# Patient Record
Sex: FEMALE | Race: WHITE | NOT HISPANIC OR LATINO | URBAN - NONMETROPOLITAN AREA
[De-identification: names, ages, dates, MRNs, and addresses within clinical notes are randomized per-mention and may not be internally consistent; named-entity substitution may affect disease eponyms.]

---

## 2024-02-13 ENCOUNTER — OFFICE VISIT (OUTPATIENT)
Dept: CARDIOLOGY | Facility: CLINIC | Age: 79
End: 2024-02-13
Payer: MEDICARE

## 2024-02-13 VITALS
BODY MASS INDEX: 18.99 KG/M2 | DIASTOLIC BLOOD PRESSURE: 67 MMHG | HEART RATE: 68 BPM | WEIGHT: 121 LBS | SYSTOLIC BLOOD PRESSURE: 131 MMHG | HEIGHT: 67 IN

## 2024-02-13 DIAGNOSIS — E78.2 MIXED HYPERLIPIDEMIA: ICD-10-CM

## 2024-02-13 DIAGNOSIS — R94.31 ABNORMAL ELECTROCARDIOGRAM (ECG) (EKG): ICD-10-CM

## 2024-02-13 DIAGNOSIS — I10 PRIMARY HYPERTENSION: ICD-10-CM

## 2024-02-13 DIAGNOSIS — I48.0 PAROXYSMAL ATRIAL FIBRILLATION: Primary | ICD-10-CM

## 2024-02-13 DIAGNOSIS — R09.89 BILATERAL CAROTID BRUITS: ICD-10-CM

## 2024-02-13 PROCEDURE — 99204 OFFICE O/P NEW MOD 45 MIN: CPT | Performed by: NURSE PRACTITIONER

## 2024-02-13 PROCEDURE — 1159F MED LIST DOCD IN RCRD: CPT | Performed by: NURSE PRACTITIONER

## 2024-02-13 PROCEDURE — 93000 ELECTROCARDIOGRAM COMPLETE: CPT | Performed by: NURSE PRACTITIONER

## 2024-02-13 PROCEDURE — 1160F RVW MEDS BY RX/DR IN RCRD: CPT | Performed by: NURSE PRACTITIONER

## 2024-02-13 RX ORDER — APIXABAN 5 MG/1
5 TABLET, FILM COATED ORAL EVERY 12 HOURS SCHEDULED
COMMUNITY
Start: 2023-12-01

## 2024-02-13 RX ORDER — ROSUVASTATIN CALCIUM 10 MG/1
1 TABLET, COATED ORAL DAILY
COMMUNITY
Start: 2003-05-05 | End: 2024-02-13 | Stop reason: SDUPTHER

## 2024-02-13 RX ORDER — ONDANSETRON 4 MG/1
8 TABLET, ORALLY DISINTEGRATING ORAL EVERY 8 HOURS PRN
COMMUNITY

## 2024-02-13 RX ORDER — TRAMADOL HYDROCHLORIDE 50 MG/1
1 TABLET ORAL 4 TIMES DAILY
COMMUNITY
Start: 2024-02-06

## 2024-02-13 RX ORDER — PNV NO.95/FERROUS FUM/FOLIC AC 28MG-0.8MG
325 TABLET ORAL
COMMUNITY
Start: 2020-07-15

## 2024-02-13 RX ORDER — OMEPRAZOLE 20 MG/1
20 CAPSULE, DELAYED RELEASE ORAL DAILY
COMMUNITY
Start: 2022-10-01

## 2024-02-13 RX ORDER — ROSUVASTATIN CALCIUM 10 MG/1
10 TABLET, COATED ORAL DAILY
Qty: 90 TABLET | Refills: 3 | Status: SHIPPED | OUTPATIENT
Start: 2024-02-13

## 2024-02-13 RX ORDER — LISINOPRIL 10 MG/1
10 TABLET ORAL DAILY
COMMUNITY
End: 2024-02-13 | Stop reason: ALTCHOICE

## 2024-02-16 ENCOUNTER — TELEPHONE (OUTPATIENT)
Dept: CARDIOLOGY | Facility: CLINIC | Age: 79
End: 2024-02-16
Payer: MEDICARE

## 2024-02-19 ENCOUNTER — TELEPHONE (OUTPATIENT)
Dept: CARDIOLOGY | Facility: CLINIC | Age: 79
End: 2024-02-19
Payer: MEDICARE

## 2024-02-19 DIAGNOSIS — K92.1 BLOOD IN STOOL: Primary | ICD-10-CM

## 2024-02-19 NOTE — TELEPHONE ENCOUNTER
Patient did hold her Eliquis for three days and took Aspirin in the interm. She is not seeing any visible blood.She is still having dark output from stoma but she takes iron and it makes it dark. Patient blood pressure today was 124/59 pulse was 60. She would like to know if she should continue to hold Eliquis or resume it .

## 2024-02-20 NOTE — TELEPHONE ENCOUNTER
Just to be on the safe side lets order Hemoccult to check it for blood.  Before she restarts her Eliquis.  Then she will know the color and consistency that she is normal with when she is taking the iron.  And she can watch for any significant changes from that will help establish a baseline if there is no blood in her stool.  Probably would be wise to get a CBC as well

## 2024-02-20 NOTE — TELEPHONE ENCOUNTER
Relay    I called and left a message on patient voicemail  that JR is ordering  a Hemoccult and CBC.I also asked patient to call back and let us know if she would like to have orders sent elsewhere.

## 2024-02-22 NOTE — PROGRESS NOTES
Hemoglobin is low at 9.5 with a hematocrit of 38.0.  This is a normocytic anemia.    CMP showed an elevated creatinine at 1.44 with an elevated BUN of 28.  Please make sure she is drinking adequate amounts of water.  Liver functions normal, no significant electrolyte abnormalities.    Check on the Hemoccult

## 2024-02-26 ENCOUNTER — HOSPITAL ENCOUNTER (OUTPATIENT)
Dept: CARDIOLOGY | Facility: HOSPITAL | Age: 79
Discharge: HOME | End: 2024-02-26
Payer: MEDICARE

## 2024-02-26 DIAGNOSIS — I48.0 PAROXYSMAL ATRIAL FIBRILLATION: ICD-10-CM

## 2024-02-26 DIAGNOSIS — I10 PRIMARY HYPERTENSION: ICD-10-CM

## 2024-02-26 DIAGNOSIS — E78.2 MIXED HYPERLIPIDEMIA: ICD-10-CM

## 2024-02-26 DIAGNOSIS — R09.89 BILATERAL CAROTID BRUITS: ICD-10-CM

## 2024-02-26 DIAGNOSIS — R94.31 ABNORMAL ELECTROCARDIOGRAM (ECG) (EKG): ICD-10-CM

## 2024-02-26 LAB — HEMOCCULT STL QL IA: NEGATIVE

## 2024-02-26 PROCEDURE — 78452 HT MUSCLE IMAGE SPECT MULT: CPT | Performed by: INTERNAL MEDICINE

## 2024-02-26 PROCEDURE — 93017 CV STRESS TEST TRACING ONLY: CPT

## 2024-02-26 PROCEDURE — 93880 EXTRACRANIAL BILAT STUDY: CPT

## 2024-02-26 PROCEDURE — 0 TECHNETIUM SESTAMIBI: Performed by: INTERNAL MEDICINE

## 2024-02-26 PROCEDURE — 25010000002 REGADENOSON 0.4 MG/5ML SOLUTION: Performed by: INTERNAL MEDICINE

## 2024-02-26 PROCEDURE — 93018 CV STRESS TEST I&R ONLY: CPT | Performed by: INTERNAL MEDICINE

## 2024-02-26 PROCEDURE — A9500 TC99M SESTAMIBI: HCPCS | Performed by: INTERNAL MEDICINE

## 2024-02-26 PROCEDURE — 78452 HT MUSCLE IMAGE SPECT MULT: CPT

## 2024-02-26 RX ORDER — REGADENOSON 0.08 MG/ML
0.4 INJECTION, SOLUTION INTRAVENOUS
Status: COMPLETED | OUTPATIENT
Start: 2024-02-26 | End: 2024-02-26

## 2024-02-26 RX ADMIN — TECHNETIUM TC 99M SESTAMIBI 1 DOSE: 1 INJECTION INTRAVENOUS at 11:29

## 2024-02-26 RX ADMIN — TECHNETIUM TC 99M SESTAMIBI 1 DOSE: 1 INJECTION INTRAVENOUS at 10:56

## 2024-02-26 RX ADMIN — REGADENOSON 0.4 MG: 0.08 INJECTION, SOLUTION INTRAVENOUS at 11:29

## 2024-02-27 LAB
BH CV REST NUCLEAR ISOTOPE DOSE: 10 MCI
BH CV STRESS COMMENTS STAGE 1: NORMAL
BH CV STRESS DOSE REGADENOSON STAGE 1: 0.4
BH CV STRESS DURATION MIN STAGE 1: 0
BH CV STRESS DURATION SEC STAGE 1: 10
BH CV STRESS NUCLEAR ISOTOPE DOSE: 30 MCI
BH CV STRESS PROTOCOL 1: NORMAL
BH CV STRESS RECOVERY BP: NORMAL MMHG
BH CV STRESS RECOVERY HR: 73 BPM
BH CV STRESS STAGE 1: 1
MAXIMAL PREDICTED HEART RATE: 143 BPM
PERCENT MAX PREDICTED HR: 53.15 %
STRESS BASELINE BP: NORMAL MMHG
STRESS BASELINE HR: 63 BPM
STRESS PERCENT HR: 63 %
STRESS POST PEAK BP: NORMAL MMHG
STRESS POST PEAK HR: 76 BPM
STRESS TARGET HR: 122 BPM

## 2024-03-02 LAB
BH CV XLRA MEAS LEFT DIST CCA EDV: 14.7 CM/SEC
BH CV XLRA MEAS LEFT DIST CCA PSV: 82.7 CM/SEC
BH CV XLRA MEAS LEFT DIST ICA EDV: -45.1 CM/SEC
BH CV XLRA MEAS LEFT DIST ICA PSV: -167 CM/SEC
BH CV XLRA MEAS LEFT ICA/CCA RATIO: 2
BH CV XLRA MEAS LEFT MID ICA EDV: -28.5 CM/SEC
BH CV XLRA MEAS LEFT MID ICA PSV: -134 CM/SEC
BH CV XLRA MEAS LEFT PROX CCA EDV: 19.6 CM/SEC
BH CV XLRA MEAS LEFT PROX CCA PSV: 111 CM/SEC
BH CV XLRA MEAS LEFT PROX ECA PSV: -83.4 CM/SEC
BH CV XLRA MEAS LEFT PROX ICA EDV: -21.7 CM/SEC
BH CV XLRA MEAS LEFT PROX ICA PSV: -96 CM/SEC
BH CV XLRA MEAS LEFT VERTEBRAL A EDV: 17.1 CM/SEC
BH CV XLRA MEAS LEFT VERTEBRAL A PSV: 68.3 CM/SEC
BH CV XLRA MEAS RIGHT DIST CCA EDV: 20.5 CM/SEC
BH CV XLRA MEAS RIGHT DIST CCA PSV: 95.7 CM/SEC
BH CV XLRA MEAS RIGHT DIST ICA EDV: -46.1 CM/SEC
BH CV XLRA MEAS RIGHT DIST ICA PSV: -155 CM/SEC
BH CV XLRA MEAS RIGHT ICA/CCA RATIO: 1.6
BH CV XLRA MEAS RIGHT MID ICA EDV: -24.2 CM/SEC
BH CV XLRA MEAS RIGHT MID ICA PSV: -115 CM/SEC
BH CV XLRA MEAS RIGHT PROX CCA EDV: 16.2 CM/SEC
BH CV XLRA MEAS RIGHT PROX CCA PSV: 102 CM/SEC
BH CV XLRA MEAS RIGHT PROX ECA PSV: -127 CM/SEC
BH CV XLRA MEAS RIGHT PROX ICA EDV: -27.1 CM/SEC
BH CV XLRA MEAS RIGHT PROX ICA PSV: -108 CM/SEC
BH CV XLRA MEAS RIGHT VERTEBRAL A EDV: 18.7 CM/SEC
BH CV XLRA MEAS RIGHT VERTEBRAL A PSV: 105 CM/SEC

## 2024-03-04 ENCOUNTER — TELEPHONE (OUTPATIENT)
Dept: CARDIOLOGY | Facility: CLINIC | Age: 79
End: 2024-03-04
Payer: MEDICARE

## 2024-03-04 NOTE — TELEPHONE ENCOUNTER
Patient left message wanting to know when she needs to restart Eliquis or can she just continue ASA.

## 2024-03-04 NOTE — TELEPHONE ENCOUNTER
STRESS/US CAROTID/LAB  Pt notified of no acute findings. Provider will discuss results at f/u. Pt reminded of appt date and time.  ----- Message from Farideh Byrd MA sent at 2/28/2024  9:58 AM EST -----    ----- Message -----  From: Redd Antoine APRN  Sent: 2/28/2024   1:02 AM EST  To: Farideh Byrd MA    Patient was found to have a normal stress test with no evidence of ischemia.  Keep follow-up.

## 2024-03-05 NOTE — TELEPHONE ENCOUNTER
Called patient with recommendations. She had labs drawn at PCP last week, still showing low and will repeat next week. Advised I will call for labs to have reviewed by Redd FRANZ.     Redd Antoine APRN Cheek, Laura Anne, MA  Caller: Unspecified (Yesterday,  3:53 PM)  Lets recheck a hemoglobin hematocrit to make sure it is stable.  Her Hemoccult was negative for blood.  She should be able to restart her Eliquis

## 2024-03-06 NOTE — TELEPHONE ENCOUNTER
Patient notified and will ensure labs are sent for review. She sees her oncologist next week.     Redd Antoine APRN Cheek, Laura Anne, MA  Caller: Unspecified (2 days ago,  3:53 PM)  I am hesitant to restart the Eliquis when her hemoglobin is below 10.  If she does have a bleed because of the Eliquis she does not have much room to compensate.  Lets give it another week and see what the recheck of her hemoglobin hematocrit is.

## 2024-03-29 ENCOUNTER — TELEPHONE (OUTPATIENT)
Dept: CARDIOLOGY | Facility: CLINIC | Age: 79
End: 2024-03-29
Payer: MEDICARE

## 2024-03-29 NOTE — TELEPHONE ENCOUNTER
Pt notified.  She reports some fatigue, but she thought it was related to chemo.  Pt instructed to reduce Metoprolol per JR due to experiencing fatigue.  Pt will monitor her HR, B/P, & symptoms and call our office in 1 week with an update.

## 2024-03-29 NOTE — TELEPHONE ENCOUNTER
----- Message from OSEI Kumari sent at 3/28/2024  9:33 AM EDT -----  1.  Symptoms: No reported symptoms  2.  Predominant rhythm: Sinus bradycardia to normal sinus rhythm  3.  Noted arrhythmias:  -Minimal PVC burden  -2% PAC burden  -No recurrence of atrial fibrillation  -Bradycardic 56% of the time, average heart rate 59 bpm, minimum 46 bpm during hours of sleep, maximum of 110 bpm  4.  Medications: Metoprolol 25 mg twice daily, Eliquis 5 mg twice daily    Patient is experiencing significant fatigue we may want to reduce her metoprolol to 12.5 mg twice daily

## 2024-04-09 ENCOUNTER — TELEPHONE (OUTPATIENT)
Dept: CARDIOLOGY | Facility: CLINIC | Age: 79
End: 2024-04-09
Payer: MEDICARE

## 2024-04-09 NOTE — TELEPHONE ENCOUNTER
Patient called in  to give me blood pressure readings.    Friday      3/29 145/57 hrt 59    Saturday 3/30 bp am 136/66 hrt 58                              pm 133/69 hrt 60    Doc   3/31 bp am 120/54 hrt 71                              Pm 138/56 hrt 58    Monday 04/01 bp am 147/71 hrt 59                                Pm 138/73 hrt 57    Tues 04/02 bp am 115/45 hrt 71                                Pm  120/51 hrt 70    Wed 04/03 bp am 124/63 hrt 63                                 Pm 148/66 hrt 61    Thursday 04/04 bp am 144/66 hrt 69                                  Pm 129/60 hrt 57    Friday 04/05  bp am 133/60 hrt 70

## 2024-04-10 NOTE — TELEPHONE ENCOUNTER
I called patient advised per JR instructions as follows:    Patient will continue the same with her blood pressure medications at this time. If patient continues to have blood pressures greater than 140 we may have to titrate medication. Patient verbalized understanding of above.

## 2024-06-25 ENCOUNTER — OFFICE VISIT (OUTPATIENT)
Dept: CARDIOLOGY | Facility: CLINIC | Age: 79
End: 2024-06-25
Payer: MEDICARE

## 2024-06-25 VITALS
HEIGHT: 67 IN | HEART RATE: 65 BPM | WEIGHT: 122.6 LBS | OXYGEN SATURATION: 100 % | SYSTOLIC BLOOD PRESSURE: 114 MMHG | BODY MASS INDEX: 19.24 KG/M2 | DIASTOLIC BLOOD PRESSURE: 67 MMHG

## 2024-06-25 DIAGNOSIS — I10 PRIMARY HYPERTENSION: ICD-10-CM

## 2024-06-25 DIAGNOSIS — I48.0 PAROXYSMAL ATRIAL FIBRILLATION: Primary | ICD-10-CM

## 2024-06-25 DIAGNOSIS — E78.2 MIXED HYPERLIPIDEMIA: ICD-10-CM

## 2024-06-25 PROCEDURE — 1159F MED LIST DOCD IN RCRD: CPT | Performed by: NURSE PRACTITIONER

## 2024-06-25 PROCEDURE — 1160F RVW MEDS BY RX/DR IN RCRD: CPT | Performed by: NURSE PRACTITIONER

## 2024-06-25 PROCEDURE — 99214 OFFICE O/P EST MOD 30 MIN: CPT | Performed by: NURSE PRACTITIONER

## 2024-06-25 RX ORDER — ASPIRIN 81 MG/1
81 TABLET ORAL DAILY
COMMUNITY

## 2024-06-25 RX ORDER — METHOCARBAMOL 500 MG/1
500 TABLET, FILM COATED ORAL AS NEEDED
COMMUNITY

## 2024-06-25 RX ORDER — LISINOPRIL 5 MG/1
5 TABLET ORAL DAILY
COMMUNITY

## 2024-06-25 NOTE — PROGRESS NOTES
Subjective     Zo Méndez is a 79 y.o. female who presents to [facility] for testing follow up  (Stress Carotid & Monitor ).    CHIEF COMPLIANT  Chief Complaint   Patient presents with    testing follow up      Stress Carotid & Monitor        Active Problems:  Problem List Items Addressed This Visit    None  Visit Diagnoses       Paroxysmal atrial fibrillation    -  Primary    Primary hypertension        Relevant Medications    lisinopril (PRINIVIL,ZESTRIL) 5 MG tablet    Mixed hyperlipidemia            Problem list  1.  Atrial fib history of GI bleed stop Eliquis  1.1 monitor 2/24: No reported symptoms, 2% PAC burden, no reoccurrence of atrial fibrillation, bradycardic 56% of the time  1.2 stress test 2/24: Negative for ischemia, preserved post-rest EF of 75%  2.  Hyperlipidemia  3.  Hypertension  3.1 echocardiogram EF 60%,normal diastolic function, trivial tricuspid regurgitation with RVSP 39 mmHg.  4.  Carotid duplex 2/24: Nonobstructive carotid artery disease bilaterally    HPI  HPI  Ms. Zo Méndez is a 79-year-old female patient who is being followed up today for atrial fibrillation and chronic arterial hypertension.  She also went under recent testing.    Patient does have a history of atrial fibrillation which she is previously on Eliquis but had to stop due to bleeding in her stomach.  She denies any palpitations and her monitor did not show any reoccurrence of atrial fibrillation.  Did discuss the benefits of atrial fibrillation and stroke protection.  However she will continue her aspirin 81 mg daily due to anemia and dark stools and does not want to proceed with Eliquis.  We also discussed Watchman device in which she also wishes to defer at this time.    Patient does have chronic arterial hypertension when she is on metoprolol and lisinopril.  Today her blood pressure is well-controlled at 114/67 heart rate is 65.  She did provide a log that showed her blood pressure is stable.  She has altered her dosing  of the metoprolol due to the fact that 12.5 mg caused her to be bradycardic.    We did discuss the stress test, and carotid duplex as listed above.  All questions were answered.  Monitor showed no significant arrhythmia and no reoccurrence of atrial fibrillation, carotid duplex showed nonobstructive carotid artery disease, stress test was negative for ischemia with a post-rest ejection fraction of 75% with no focal wall motion abnormalities.    Overall she feels like she is doing well from the cardiovascular standpoint.  She denies any angina anginal equivalent symptoms.  Patient denies any chest pain, shortness of breath, palpitations, lower extremity edema, fatigue, syncope, PND, orthopnea, or strokelike symptoms.  PRIOR MEDS  Current Outpatient Medications on File Prior to Visit   Medication Sig Dispense Refill    Cyanocobalamin (Vitamin B12) 1000 MCG tablet controlled-release Take 1,000 mcg by mouth 2 (Two) Times a Day.      ferrous sulfate 325 (65 Fe) MG tablet Take 1 tablet by mouth Daily With Breakfast.      lisinopril (PRINIVIL,ZESTRIL) 5 MG tablet Take 1 tablet by mouth Daily.      methocarbamol (ROBAXIN) 500 MG tablet Take 1 tablet by mouth As Needed for Muscle Spasms.      metoprolol tartrate (LOPRESSOR) 25 MG tablet Take 6.5 mg by mouth Daily.      omeprazole (priLOSEC) 20 MG capsule Take 1 capsule by mouth Daily.      rosuvastatin (CRESTOR) 10 MG tablet Take 1 tablet by mouth Daily. 90 tablet 3    traMADol (ULTRAM) 50 MG tablet Take 1 tablet by mouth 4 (Four) Times a Day.      aspirin 81 MG EC tablet Take 1 tablet by mouth Daily.      [DISCONTINUED] cholecalciferol (VITAMIN D3) 1.25 MG (51991 UT) capsule Take 1 capsule by mouth 1 (One) Time Per Week.      [DISCONTINUED] Eliquis 5 MG tablet tablet Take 1 tablet by mouth Every 12 (Twelve) Hours.      [DISCONTINUED] ondansetron ODT (ZOFRAN-ODT) 4 MG disintegrating tablet Place 2 tablets on the tongue Every 8 (Eight) Hours As Needed.      [DISCONTINUED]  "promethazine (PHENERGAN) 12.5 MG half tablet Take 1 half tablet by mouth Every 6 (Six) Hours As Needed.       No current facility-administered medications on file prior to visit.       ALLERGIES  Ciprofloxacin, Penicillins, and Sulfa antibiotics    HISTORY  Past Medical History:   Diagnosis Date    Hyperlipidemia     Hypertension     Lung cancer 2023    current  Dr Cheney    Obstruction of bowel        Social History     Socioeconomic History    Marital status:    Tobacco Use    Smoking status: Never    Smokeless tobacco: Never   Vaping Use    Vaping status: Never Used   Substance and Sexual Activity    Alcohol use: Never    Drug use: Never    Sexual activity: Defer       Family History   Problem Relation Age of Onset    Hyperlipidemia Mother     Hypertension Mother     Lung disease Father         black lung    Breast cancer Sister     Pancreatic cancer Brother         worked in chemicals and [employer]       Review of Systems   Constitutional:  Positive for fatigue. Negative for chills and fever.   HENT:  Negative for congestion, rhinorrhea and sore throat.    Eyes:  Negative for visual disturbance.   Respiratory:  Negative for apnea, chest tightness and shortness of breath.    Cardiovascular:  Negative for chest pain, palpitations and leg swelling.   Gastrointestinal:  Positive for nausea. Negative for constipation and diarrhea.   Musculoskeletal:  Positive for back pain. Negative for arthralgias and neck pain.   Allergic/Immunologic: Positive for environmental allergies. Negative for food allergies.   Neurological:  Negative for dizziness, syncope, weakness and light-headedness.   Hematological:  Does not bruise/bleed easily.   Psychiatric/Behavioral:  Negative for sleep disturbance.        Objective     VITALS: /67 (BP Location: Right arm, Patient Position: Sitting, Cuff Size: Adult)   Pulse 65   Ht 170.2 cm (67.01\")   Wt 55.6 kg (122 lb 9.6 oz)   SpO2 100%   BMI 19.20 kg/m²     LABS:   Lab Results " (most recent)       None            IMAGING:   No Images in the past 120 days found..    EXAM:  Physical Exam  Vitals and nursing note reviewed.   Constitutional:       Appearance: She is well-developed.   HENT:      Head: Normocephalic.   Neck:      Thyroid: No thyroid mass.      Vascular: Carotid bruit (bilateral) present. No JVD.      Trachea: Trachea and phonation normal.   Cardiovascular:      Rate and Rhythm: Regular rhythm. Bradycardia present.      Pulses:           Radial pulses are 2+ on the right side and 2+ on the left side.        Posterior tibial pulses are 2+ on the right side and 2+ on the left side.      Heart sounds: Murmur heard.      No friction rub. No gallop.   Pulmonary:      Effort: Pulmonary effort is normal. No respiratory distress.      Breath sounds: Normal breath sounds. No wheezing or rales.   Musculoskeletal:         General: No swelling. Normal range of motion.      Cervical back: Neck supple.   Skin:     General: Skin is warm and dry.      Capillary Refill: Capillary refill takes less than 2 seconds.      Findings: No rash.   Neurological:      Mental Status: She is alert and oriented to person, place, and time.   Psychiatric:         Speech: Speech normal.         Behavior: Behavior normal.         Thought Content: Thought content normal.         Judgment: Judgment normal.         Procedure   Procedures       Assessment & Plan    Diagnosis Plan   1. Paroxysmal atrial fibrillation        2. Primary hypertension        3. Mixed hyperlipidemia        1.  Patient does have paroxysmal atrial fibrillation which she had to discontinue her Eliquis due to anemia and dark stools.  We did discuss the benefit of blood thinners with atrial fibrillation and the reduction of stroke.  She verbalized understanding.  Also discussed Watchman device.  She wishes to defer at this time she will continue aspirin 81 mg daily.  2.  Patient's blood pressure is controlled on current blood pressure medication  regimen.  No medication changes are warranted at this time.  Patient advised to monitor blood pressure on a daily basis and report any persistent highs or lows.  Set goal blood pressure for patient at 130/80 or below.  3.  Overall patient seems to be doing well from a cardiovascular standpoint.  She denies any angina anginal equivalent symptoms.  We will continue to monitor at this time.  4.  Informed of signs and symptoms of ACS and advised to seek emergent treatment for any new worsening symptoms.  Patient also advised sooner follow-up as needed.  Also advised to follow-up with family doctor as needed  This note is dictated utilizing voice recognition software.  Although this record has been proof read, transcriptional errors may still be present. If questions occur regarding the content of this record please do not hesitate to call our office.  I have reviewed and confirmed the accuracy of the ROS as documented by the MA/LPN/RN OSEI Kumari    Return if symptoms worsen or fail to improve, for Next scheduled follow up.    Diagnoses and all orders for this visit:    1. Paroxysmal atrial fibrillation (Primary)    2. Primary hypertension    3. Mixed hyperlipidemia        Zo Méndez  reports that she has never smoked. She has never used smokeless tobacco. I have educated her on the risk of diseases from using tobacco products. Patient does not smoke.     BMI is within normal parameters. No other follow-up for BMI required.    Advance Care Planning   ACP discussion was held with the patient during this visit. Patient has an advance directive (not in EMR), copy requested.             MEDS ORDERED DURING VISIT:  No orders of the defined types were placed in this encounter.          This document has been electronically signed by OSEI Kumari Jr.  June 25, 2024 11:27 EDT

## 2025-01-02 ENCOUNTER — OFFICE VISIT (OUTPATIENT)
Dept: CARDIOLOGY | Facility: CLINIC | Age: 80
End: 2025-01-02
Payer: MEDICARE

## 2025-01-02 VITALS
WEIGHT: 126.2 LBS | HEIGHT: 67 IN | BODY MASS INDEX: 19.81 KG/M2 | SYSTOLIC BLOOD PRESSURE: 110 MMHG | HEART RATE: 75 BPM | OXYGEN SATURATION: 99 % | DIASTOLIC BLOOD PRESSURE: 58 MMHG

## 2025-01-02 DIAGNOSIS — E78.2 MIXED HYPERLIPIDEMIA: ICD-10-CM

## 2025-01-02 DIAGNOSIS — I48.0 PAROXYSMAL ATRIAL FIBRILLATION: Primary | ICD-10-CM

## 2025-01-02 DIAGNOSIS — I10 PRIMARY HYPERTENSION: ICD-10-CM

## 2025-01-02 PROCEDURE — 1160F RVW MEDS BY RX/DR IN RCRD: CPT | Performed by: NURSE PRACTITIONER

## 2025-01-02 PROCEDURE — 99213 OFFICE O/P EST LOW 20 MIN: CPT | Performed by: NURSE PRACTITIONER

## 2025-01-02 PROCEDURE — 1159F MED LIST DOCD IN RCRD: CPT | Performed by: NURSE PRACTITIONER

## 2025-01-02 PROCEDURE — 93000 ELECTROCARDIOGRAM COMPLETE: CPT | Performed by: NURSE PRACTITIONER

## 2025-01-02 RX ORDER — ERLOTINIB HYDROCHLORIDE 150 MG/1
150 TABLET, FILM COATED ORAL DAILY
COMMUNITY

## 2025-01-02 RX ORDER — LISINOPRIL 10 MG/1
10 TABLET ORAL DAILY
COMMUNITY

## 2025-01-02 NOTE — PROGRESS NOTES
Subjective     Zo Méndez is a 79 y.o. female who presents to day for Follow-up, Atrial Fibrillation, and Hypertension.    CHIEF COMPLIANT  Chief Complaint   Patient presents with    Follow-up    Atrial Fibrillation    Hypertension       Active Problems:  Problem List Items Addressed This Visit    None  Visit Diagnoses       Paroxysmal atrial fibrillation    -  Primary    Relevant Orders    ECG 12 Lead    Primary hypertension        Relevant Medications    lisinopril (PRINIVIL,ZESTRIL) 10 MG tablet    Other Relevant Orders    ECG 12 Lead    Mixed hyperlipidemia        Relevant Orders    ECG 12 Lead        Problem list  1.  Atrial fib history of GI bleed stop Eliquis  1.1 monitor 2/24: No reported symptoms, 2% PAC burden, no reoccurrence of atrial fibrillation, bradycardic 56% of the time  1.2 stress test 2/24: Negative for ischemia, preserved post-rest EF of 75%  2.  Hyperlipidemia  3.  Hypertension  3.1 echocardiogram EF 60%,normal diastolic function, trivial tricuspid regurgitation with RVSP 39 mmHg.  4.  Carotid duplex 2/24: Nonobstructive carotid artery disease bilaterally    HPI  HPI  Ms. Zo Méndez is a 79-year-old female patient who is being followed up today for atrial fibrillation and chronic arterial hypertension.  She also went under recent testing.     Patient does have a history of atrial fibrillation which she is previously on Eliquis but had to stop due to bleeding in her stomach.  She denies any palpitations and her monitor did not show any reoccurrence of atrial fibrillation.  Did discuss the benefits of atrial fibrillation and stroke protection.  However she also unable to tolerate aspirin 81 mg daily due to anemia and dark stools and does not want to proceed with Eliquis or aspirin.  We also touched base on the Watchman device in which she provided information.  At this time she also wishes to defer.     Patient does have chronic arterial hypertension when she is on  lisinopril.  Today her  blood pressure is 110/58.    Overall patient seems to be doing well from the cardiovascular standpoint.  She denies any significant angina anginal equivalent symptoms.  Patient denies any chest pain, shortness of breath, palpitations, lower extremity edema, fatigue, syncope, PND, orthopnea, or strokelike symptoms.  PRIOR MEDS  Current Outpatient Medications on File Prior to Visit   Medication Sig Dispense Refill    Cyanocobalamin (Vitamin B12) 1000 MCG tablet controlled-release Take 1,000 mcg by mouth 2 (Two) Times a Day.      erlotinib (TARCEVA) 150 MG chemo tablet Take 1 tablet by mouth Daily.      ferrous sulfate 325 (65 Fe) MG tablet Take 1 tablet by mouth Daily With Breakfast.      lisinopril (PRINIVIL,ZESTRIL) 10 MG tablet Take 1 tablet by mouth Daily.      methocarbamol (ROBAXIN) 500 MG tablet Take 1 tablet by mouth As Needed for Muscle Spasms.      omeprazole (priLOSEC) 20 MG capsule Take 1 capsule by mouth Daily.      rosuvastatin (CRESTOR) 10 MG tablet Take 1 tablet by mouth Daily. 90 tablet 3    traMADol (ULTRAM) 50 MG tablet Take 1 tablet by mouth 4 (Four) Times a Day.      [DISCONTINUED] aspirin 81 MG EC tablet Take 1 tablet by mouth Daily.      [DISCONTINUED] lisinopril (PRINIVIL,ZESTRIL) 5 MG tablet Take 1 tablet by mouth Daily.      [DISCONTINUED] metoprolol tartrate (LOPRESSOR) 25 MG tablet Take 6.5 mg by mouth Daily.       No current facility-administered medications on file prior to visit.       ALLERGIES  Ciprofloxacin, Penicillins, and Sulfa antibiotics    HISTORY  Past Medical History:   Diagnosis Date    Hyperlipidemia     Hypertension     Lung cancer 2023    current  Dr Cheney    Obstruction of bowel        Social History     Socioeconomic History    Marital status:    Tobacco Use    Smoking status: Never    Smokeless tobacco: Never   Vaping Use    Vaping status: Never Used   Substance and Sexual Activity    Alcohol use: Never    Drug use: Never    Sexual activity: Defer       Family  "History   Problem Relation Age of Onset    Hyperlipidemia Mother     Hypertension Mother     Lung disease Father         black lung    Breast cancer Sister     Pancreatic cancer Brother         worked in chemicals and dyes       Review of Systems   Constitutional:  Positive for fatigue. Negative for chills and fever.   HENT:  Negative for congestion, rhinorrhea and sore throat.    Eyes:  Negative for visual disturbance (wears glasses).   Respiratory:  Negative for apnea, chest tightness and shortness of breath.    Cardiovascular:  Negative for chest pain, palpitations and leg swelling.   Gastrointestinal:  Positive for nausea. Negative for constipation and diarrhea.   Musculoskeletal:  Positive for back pain. Negative for arthralgias and neck pain.   Skin:  Positive for rash (is from medication). Negative for wound.   Allergic/Immunologic: Positive for environmental allergies. Negative for food allergies.   Neurological:  Negative for dizziness, syncope, weakness and light-headedness.   Hematological:  Does not bruise/bleed easily.   Psychiatric/Behavioral:  Negative for sleep disturbance.        Objective     VITALS: Pulse 75   Ht 170.2 cm (67.01\")   Wt 57.2 kg (126 lb 3.2 oz)   SpO2 99%   BMI 19.76 kg/m²     LABS:   Lab Results (most recent)       None            IMAGING:   No Images in the past 120 days found..    EXAM:  Physical Exam  Vitals and nursing note reviewed.   Constitutional:       Appearance: She is well-developed.   HENT:      Head: Normocephalic.   Neck:      Thyroid: No thyroid mass.      Vascular: Carotid bruit present. No JVD.      Trachea: Trachea and phonation normal.   Cardiovascular:      Rate and Rhythm: Normal rate and regular rhythm.      Pulses:           Radial pulses are 2+ on the right side and 2+ on the left side.        Posterior tibial pulses are 2+ on the right side and 2+ on the left side.      Heart sounds: Murmur heard.      No friction rub. No gallop.   Pulmonary:      " Effort: Pulmonary effort is normal. No respiratory distress.      Breath sounds: Normal breath sounds. No wheezing or rales.   Musculoskeletal:         General: No swelling. Normal range of motion.      Cervical back: Neck supple.   Skin:     General: Skin is warm and dry.      Capillary Refill: Capillary refill takes less than 2 seconds.      Findings: No rash.   Neurological:      Mental Status: She is alert and oriented to person, place, and time.   Psychiatric:         Speech: Speech normal.         Behavior: Behavior normal.         Thought Content: Thought content normal.         Judgment: Judgment normal.         Procedure     ECG 12 Lead    Date/Time: 1/2/2025 10:53 AM  Performed by: Redd Antoine APRN    Authorized by: Redd Antoine APRN  Comparison: compared with previous ECG from 2/13/2024  Similar to previous ECG  Rhythm: sinus rhythm  Rate: normal  BPM: 76  QRS axis: normal  Comments: Qtc 414 ms  No acute changes             Assessment & Plan    Diagnosis Plan   1. Paroxysmal atrial fibrillation  ECG 12 Lead      2. Primary hypertension  ECG 12 Lead      3. Mixed hyperlipidemia  ECG 12 Lead      1.  Patient does have a history of paroxysmal atrial fibrillation which she has had no reoccurrence per her knowledge.  She is not on any antiplatelet or anticoagulation due to bleeding.  We did discuss left atrial appendage closure device including written information.  She is going to consider but wishes to defer at this time.    2.  Patient's blood pressure is controlled on current blood pressure medication regimen.  No medication changes are warranted at this time.  Patient advised to monitor blood pressure on a daily basis and report any persistent highs or lows.  Set goal blood pressure for patient at 130/80 or below.    3.  Informed of signs and symptoms of ACS and advised to seek emergent treatment for any new worsening symptoms.  Patient also advised sooner follow-up as needed.  Also advised to  follow-up with family doctor as needed  This note is dictated utilizing voice recognition software.  Although this record has been proof read, transcriptional errors may still be present. If questions occur regarding the content of this record please do not hesitate to call our office.  I have reviewed and confirmed the accuracy of the ROS as documented by the MA/LPN/RN OSEI Kumari    Return in about 6 months (around 7/2/2025), or if symptoms worsen or fail to improve.    Diagnoses and all orders for this visit:    1. Paroxysmal atrial fibrillation (Primary)  -     ECG 12 Lead    2. Primary hypertension  -     ECG 12 Lead    3. Mixed hyperlipidemia  -     ECG 12 Lead        Zo Méndez  reports that she has never smoked. She has never used smokeless tobacco. I have educated her on the risk of diseases from using tobacco products. Patient does not smoke.        BMI is within normal parameters. No other follow-up for BMI required.    Advance Care Planning   ACP discussion was held with the patient during this visit. Patient has a living will . Copy requested.               MEDS ORDERED DURING VISIT:  No orders of the defined types were placed in this encounter.          This document has been electronically signed by OSEI Kumari Jr.  January 2, 2025 11:20 EST

## 2025-07-21 ENCOUNTER — OFFICE VISIT (OUTPATIENT)
Dept: CARDIOLOGY | Facility: CLINIC | Age: 80
End: 2025-07-21
Payer: MEDICARE

## 2025-07-21 VITALS
SYSTOLIC BLOOD PRESSURE: 144 MMHG | OXYGEN SATURATION: 97 % | HEART RATE: 74 BPM | WEIGHT: 122.4 LBS | DIASTOLIC BLOOD PRESSURE: 67 MMHG | HEIGHT: 67 IN | BODY MASS INDEX: 19.21 KG/M2

## 2025-07-21 DIAGNOSIS — E78.2 MIXED HYPERLIPIDEMIA: ICD-10-CM

## 2025-07-21 DIAGNOSIS — I10 PRIMARY HYPERTENSION: Primary | ICD-10-CM

## 2025-07-21 PROCEDURE — 1160F RVW MEDS BY RX/DR IN RCRD: CPT | Performed by: NURSE PRACTITIONER

## 2025-07-21 PROCEDURE — 1159F MED LIST DOCD IN RCRD: CPT | Performed by: NURSE PRACTITIONER

## 2025-07-21 PROCEDURE — 99213 OFFICE O/P EST LOW 20 MIN: CPT | Performed by: NURSE PRACTITIONER

## 2025-07-21 RX ORDER — LISINOPRIL 5 MG/1
5 TABLET ORAL DAILY PRN
COMMUNITY

## 2025-07-21 NOTE — PROGRESS NOTES
Subjective     Zo Méndez is a 80 y.o. female who presents to day for Atrial Fibrillation and Follow-up (Has not been taking BP medication blood pressure has been running low 90/46. Highest bp has been was 128/52. Took it once in May and once in June.).    CHIEF COMPLIANT  Chief Complaint   Patient presents with    Atrial Fibrillation    Follow-up     Has not been taking BP medication blood pressure has been running low 90/46. Highest bp has been was 128/52. Took it once in May and once in June.       Active Problems:  Problem List Items Addressed This Visit    None  Visit Diagnoses         Primary hypertension    -  Primary    Relevant Medications    lisinopril (PRINIVIL,ZESTRIL) 5 MG tablet      Mixed hyperlipidemia            Problem list  1.  Atrial fib history of GI bleed stop Eliquis  1.1 monitor 2/24: No reported symptoms, 2% PAC burden, no reoccurrence of atrial fibrillation, bradycardic 56% of the time  1.2 stress test 2/24: Negative for ischemia, preserved post-rest EF of 75%  2.  Hyperlipidemia  3.  Hypertension  3.1 echocardiogram EF 60%,normal diastolic function, trivial tricuspid regurgitation with RVSP 39 mmHg.  4.  Carotid duplex 2/24: Nonobstructive carotid artery disease bilaterally    HPI  HPI  Ms. Zo Méndez is a 80-year-old female patient who is being followed up today for atrial fibrillation and chronic arterial hypertension.    Patient does have a history of atrial fibrillation which she is previously on Eliquis but had to stop due to bleeding in her stomach.  She denies any palpitations and her monitor did not show any reoccurrence of atrial fibrillation.  Did discuss the benefits of atrial fibrillation and stroke protection.  However she also unable to tolerate aspirin 81 mg daily due to anemia and dark stools and does not want to proceed with Eliquis or aspirin.  We also touched base on the Watchman device in which she provided information.  At this time she also wishes to defer.      Patient does have chronic arterial hypertension when she is on  lisinopril.  Today her blood pressure is 144/67 and heart rate 74..  She reports that her blood pressure is 116/55 this morning.  Says it always runs good at home.    Patient reports that she has been working more and she has had a lot more energy for over the last month.  She has been done extremely well.    Patient denies any chest pain, shortness of breath, palpitations, lower extremity edema,  syncope, PND, orthopnea, or strokelike symptoms.  PRIOR MEDS  Current Outpatient Medications on File Prior to Visit   Medication Sig Dispense Refill    Cyanocobalamin (Vitamin B12) 1000 MCG tablet controlled-release Take 1,000 mcg by mouth 2 (Two) Times a Day.      erlotinib (TARCEVA) 150 MG chemo tablet Take 1 tablet by mouth Daily.      ferrous sulfate 325 (65 Fe) MG tablet Take 1 tablet by mouth Daily With Breakfast.      lisinopril (PRINIVIL,ZESTRIL) 5 MG tablet Take 1 tablet by mouth Daily As Needed.      omeprazole (priLOSEC) 20 MG capsule Take 1 capsule by mouth Daily.      rosuvastatin (CRESTOR) 10 MG tablet Take 1 tablet by mouth Daily. 90 tablet 3    traMADol (ULTRAM) 50 MG tablet Take 1 tablet by mouth 4 (Four) Times a Day.      methocarbamol (ROBAXIN) 500 MG tablet Take 1 tablet by mouth As Needed for Muscle Spasms.      [DISCONTINUED] lisinopril (PRINIVIL,ZESTRIL) 10 MG tablet Take 1 tablet by mouth Daily.       No current facility-administered medications on file prior to visit.       ALLERGIES  Ciprofloxacin, Penicillins, and Sulfa antibiotics    HISTORY  Past Medical History:   Diagnosis Date    Hyperlipidemia     Hypertension     Kidney stone 05/2025    was able to pass it    Lung cancer 2023    current  Dr Cheney    Obstruction of bowel        Social History     Socioeconomic History    Marital status:    Tobacco Use    Smoking status: Never    Smokeless tobacco: Never   Vaping Use    Vaping status: Never Used   Substance and Sexual  "Activity    Alcohol use: Never    Drug use: Never    Sexual activity: Defer       Family History   Problem Relation Age of Onset    Hyperlipidemia Mother     Hypertension Mother     Lung disease Father         black lung    Breast cancer Sister     Pancreatic cancer Brother         worked in chemicals and [employer]       Review of Systems   Constitutional:  Positive for fatigue.   Respiratory:  Negative for apnea, chest tightness and shortness of breath.    Cardiovascular:  Negative for chest pain, palpitations and leg swelling.   Gastrointestinal:  Positive for nausea. Negative for constipation and diarrhea.   Neurological:  Positive for weakness (over exertion). Negative for dizziness, syncope and light-headedness.   Hematological:  Does not bruise/bleed easily.   Psychiatric/Behavioral:  Negative for sleep disturbance.        Objective     VITALS: /67 (BP Location: Right arm, Patient Position: Sitting, Cuff Size: Adult)   Pulse 74   Ht 170.2 cm (67.01\")   Wt 55.5 kg (122 lb 6.4 oz)   SpO2 97%   BMI 19.17 kg/m²     LABS:   Lab Results (most recent)       None            IMAGING:   No Images in the past 120 days found..    EXAM:  Physical Exam  Vitals and nursing note reviewed.   Constitutional:       Appearance: She is well-developed.   HENT:      Head: Normocephalic.   Neck:      Thyroid: No thyroid mass.      Vascular: Carotid bruit present. No JVD.      Trachea: Trachea and phonation normal.   Cardiovascular:      Rate and Rhythm: Normal rate and regular rhythm.      Pulses:           Radial pulses are 2+ on the right side and 2+ on the left side.        Posterior tibial pulses are 2+ on the right side and 2+ on the left side.      Heart sounds: Murmur heard.      No friction rub. No gallop.   Pulmonary:      Effort: Pulmonary effort is normal. No respiratory distress.      Breath sounds: Normal breath sounds. No wheezing or rales.   Musculoskeletal:         General: No swelling. Normal range of motion. "      Cervical back: Neck supple.   Skin:     General: Skin is warm and dry.      Capillary Refill: Capillary refill takes less than 2 seconds.      Findings: No rash.   Neurological:      Mental Status: She is alert and oriented to person, place, and time.   Psychiatric:         Speech: Speech normal.         Behavior: Behavior normal.         Thought Content: Thought content normal.         Judgment: Judgment normal.         Procedure   Procedures       Assessment & Plan    Diagnosis Plan   1. Primary hypertension        2. Mixed hyperlipidemia        1.  Patient's blood pressure is controlled on current blood pressure medication regimen despite elevated blood pressure today.  No medication changes are warranted at this time.  Patient advised to monitor blood pressure on a daily basis and report any persistent highs or lows.  Set goal blood pressure for patient at 130/80 or below.  2.  Patient does have hyperlipidemia in which she is scheduled to have blood work done by her PCP did ask for a copy of those labs to be sent to us to follow alongside  3.  Overall patient seems to be doing well from the cardiovascular standpoint.  Denies any angina anginal equivalent symptoms.  Will continue to follow.  4.  Informed of signs and symptoms of ACS and advised to seek emergent treatment for any new worsening symptoms.  Patient also advised sooner follow-up as needed.  Also advised to follow-up with family doctor as needed  This note is dictated utilizing voice recognition software.  Although this record has been proof read, transcriptional errors may still be present. If questions occur regarding the content of this record please do not hesitate to call our office.  I have reviewed and confirmed the accuracy of the ROS as documented by the MA/LPN/RN OSEI Kumari    Return in about 6 months (around 1/21/2026), or if symptoms worsen or fail to improve.    Diagnoses and all orders for this visit:    1. Primary  hypertension (Primary)    2. Mixed hyperlipidemia        Zo Méndez  reports that she has never smoked. She has never used smokeless tobacco. I have educated her on the risk of diseases from using tobacco products. Patient does not smoke.        Advance Care Planning   ACP discussion was held with the patient during this visit. Patient has an advance directive (not in EMR), copy requested.        BMI is within normal parameters. No other follow-up for BMI required.           MEDS ORDERED DURING VISIT:  No orders of the defined types were placed in this encounter.          This document has been electronically signed by Redd Antoine Jr., OSEI  July 21, 2025 14:18 EDT